# Patient Record
Sex: MALE
[De-identification: names, ages, dates, MRNs, and addresses within clinical notes are randomized per-mention and may not be internally consistent; named-entity substitution may affect disease eponyms.]

---

## 2019-06-18 ENCOUNTER — APPOINTMENT (OUTPATIENT)
Dept: OTOLARYNGOLOGY | Facility: CLINIC | Age: 84
End: 2019-06-18

## 2020-08-03 ENCOUNTER — APPOINTMENT (OUTPATIENT)
Dept: OTOLARYNGOLOGY | Facility: CLINIC | Age: 85
End: 2020-08-03
Payer: MEDICARE

## 2020-08-03 DIAGNOSIS — Z86.69 PERSONAL HISTORY OF OTHER DISEASES OF THE NERVOUS SYSTEM AND SENSE ORGANS: ICD-10-CM

## 2020-08-03 DIAGNOSIS — Z86.39 PERSONAL HISTORY OF OTHER ENDOCRINE, NUTRITIONAL AND METABOLIC DISEASE: ICD-10-CM

## 2020-08-03 DIAGNOSIS — Z86.79 PERSONAL HISTORY OF OTHER DISEASES OF THE CIRCULATORY SYSTEM: ICD-10-CM

## 2020-08-03 DIAGNOSIS — Z85.820 PERSONAL HISTORY OF MALIGNANT MELANOMA OF SKIN: ICD-10-CM

## 2020-08-03 DIAGNOSIS — Z83.3 FAMILY HISTORY OF DIABETES MELLITUS: ICD-10-CM

## 2020-08-03 DIAGNOSIS — Z87.891 PERSONAL HISTORY OF NICOTINE DEPENDENCE: ICD-10-CM

## 2020-08-03 PROCEDURE — 69210 REMOVE IMPACTED EAR WAX UNI: CPT

## 2020-08-03 PROCEDURE — 99213 OFFICE O/P EST LOW 20 MIN: CPT | Mod: 25

## 2020-08-03 RX ORDER — ROSUVASTATIN CALCIUM 5 MG/1
5 TABLET, FILM COATED ORAL
Qty: 30 | Refills: 0 | Status: ACTIVE | COMMUNITY
Start: 2019-11-11

## 2020-08-03 RX ORDER — CLOTRIMAZOLE 10 MG/ML
1 SOLUTION TOPICAL
Qty: 30 | Refills: 0 | Status: ACTIVE | COMMUNITY
Start: 2020-07-29

## 2020-08-03 RX ORDER — SULFAMETHOXAZOLE AND TRIMETHOPRIM 800; 160 MG/1; MG/1
800-160 TABLET ORAL
Qty: 14 | Refills: 0 | Status: ACTIVE | COMMUNITY
Start: 2020-06-19

## 2020-08-03 RX ORDER — METOPROLOL TARTRATE 25 MG/1
25 TABLET, FILM COATED ORAL
Qty: 60 | Refills: 0 | Status: ACTIVE | COMMUNITY
Start: 2019-11-11

## 2020-08-03 RX ORDER — FUROSEMIDE 40 MG/1
40 TABLET ORAL
Qty: 30 | Refills: 0 | Status: ACTIVE | COMMUNITY
Start: 2019-11-11

## 2020-08-03 RX ORDER — TAMSULOSIN HYDROCHLORIDE 0.4 MG/1
0.4 CAPSULE ORAL
Qty: 30 | Refills: 0 | Status: ACTIVE | COMMUNITY
Start: 2019-11-11

## 2020-08-03 RX ORDER — AMLODIPINE BESYLATE 2.5 MG/1
2.5 TABLET ORAL
Qty: 30 | Refills: 0 | Status: ACTIVE | COMMUNITY
Start: 2020-06-28

## 2020-08-03 RX ORDER — QUINAPRIL HYDROCHLORIDE 10 MG/1
10 TABLET, FILM COATED ORAL
Qty: 30 | Refills: 0 | Status: ACTIVE | COMMUNITY
Start: 2020-06-28

## 2020-08-03 RX ORDER — CLOPIDOGREL BISULFATE 75 MG/1
75 TABLET, FILM COATED ORAL
Qty: 30 | Refills: 0 | Status: ACTIVE | COMMUNITY
Start: 2019-11-11

## 2020-08-03 NOTE — ASSESSMENT
[FreeTextEntry1] : cerumen impaction as removed. He was able to hear better afterwards. \par \par PLAN\par \par -findings and management options discussed in detail with the patient and his wife. \par -good aural hygiene\par -annual audiogram- he has his hearing checked by his audiologist. \par -he will see his audiologist to check his hearing aids as needed. \par -follow up in 6 months\par -call and return earlier if any concerns. \par \par

## 2020-08-03 NOTE — HISTORY OF PRESENT ILLNESS
[de-identified] : HUY ROBERTO is a 89 year patient Here for cerumen impaction. He saw his audiologist. He was found to have wax buildup. He has a history of bilateral sensorineural hearing loss. He uses hearing aids.  He has no otalgia otorrhea. He has done well through the COVID outbreak. All precautions and recommendations per [facility] were taken during the visit including the use of PPE.\par

## 2020-08-03 NOTE — CONSULT LETTER
[Dear  ___] : Dear  [unfilled], [Courtesy Letter:] : I had the pleasure of seeing your patient, [unfilled], in my office today. [Sincerely,] : Sincerely, [Consult Closing:] : Thank you very much for allowing me to participate in the care of this patient.  If you have any questions, please do not hesitate to contact me. [Please see my note below.] : Please see my note below. [FreeTextEntry3] : Bianca Caldwell MD\par

## 2021-03-08 ENCOUNTER — APPOINTMENT (OUTPATIENT)
Dept: OTOLARYNGOLOGY | Facility: CLINIC | Age: 86
End: 2021-03-08

## 2021-06-09 ENCOUNTER — APPOINTMENT (OUTPATIENT)
Dept: OTOLARYNGOLOGY | Facility: CLINIC | Age: 86
End: 2021-06-09
Payer: MEDICARE

## 2021-06-09 VITALS — TEMPERATURE: 97 F

## 2021-06-09 PROCEDURE — 69210 REMOVE IMPACTED EAR WAX UNI: CPT

## 2021-06-09 PROCEDURE — 99212 OFFICE O/P EST SF 10 MIN: CPT | Mod: 25

## 2021-06-09 NOTE — HISTORY OF PRESENT ILLNESS
[de-identified] : HUY ROBERTO is a 89 year patient With a history of hearing loss and recurrent cerumen impaction. He is here to have his ears cleaned. He has had more difficulty hearing. He saw his audiologist recently. He has no otalgia or otorrhea.

## 2021-06-09 NOTE — ASSESSMENT
[FreeTextEntry1] : Cerumen impaction was removed. He could hear better afterwards. \par \par PLAN\par \par -findings and management options discussed in detail with the patient and his wife. \par -good aural hygiene\par -he will see his audiologist to check his hearing aids as needed. and his hearing as needed .\par -follow up in 6 months. \par -call and return earlier if any concerns. \par \par

## 2021-06-09 NOTE — CONSULT LETTER
[Dear  ___] : Dear  [unfilled], [Courtesy Letter:] : I had the pleasure of seeing your patient, [unfilled], in my office today. [Please see my note below.] : Please see my note below. [Consult Closing:] : Thank you very much for allowing me to participate in the care of this patient.  If you have any questions, please do not hesitate to contact me. [Sincerely,] : Sincerely, [FreeTextEntry3] : Bianca Caldwell MD\par

## 2021-11-18 ENCOUNTER — APPOINTMENT (OUTPATIENT)
Dept: OTOLARYNGOLOGY | Facility: CLINIC | Age: 86
End: 2021-11-18
Payer: MEDICARE

## 2021-11-18 PROCEDURE — 69210 REMOVE IMPACTED EAR WAX UNI: CPT

## 2021-11-18 PROCEDURE — 99212 OFFICE O/P EST SF 10 MIN: CPT | Mod: 25

## 2021-11-18 NOTE — ASSESSMENT
[FreeTextEntry1] : Cerumen impaction was removed. He was able to hear better afterwards.\par \par PLAN\par \par -findings and management options discussed in detail with the patient and his wife. \par -good aural hygiene\par -he will follow up with his audiologist\par -We discussed workup for the throat symptoms. I asked her to consider further evaluation with flexible laryngoscopy and possible barium swallow depending on how he is doing. She is going to hold for now. \par -follow up in 6 months or earlier if needed.

## 2021-11-18 NOTE — HISTORY OF PRESENT ILLNESS
[de-identified] : HUY ROBERTO is a 89 year patient Here for cerumen impaction. He has a history of bilateral sensorineural hearing loss. He uses hearing aid. He has no otalgia or otorrhea. His wife also says he has occasional throat clearing and coughing when he tries to talk while eating. She denies aspiration or dysphagia. He may have reflux

## 2022-04-18 ENCOUNTER — APPOINTMENT (OUTPATIENT)
Dept: OTOLARYNGOLOGY | Facility: CLINIC | Age: 87
End: 2022-04-18
Payer: MEDICARE

## 2022-04-18 VITALS — WEIGHT: 153 LBS | HEIGHT: 68 IN | TEMPERATURE: 97.8 F | BODY MASS INDEX: 23.19 KG/M2

## 2022-04-18 PROCEDURE — 99212 OFFICE O/P EST SF 10 MIN: CPT | Mod: 25

## 2022-04-18 PROCEDURE — 69210 REMOVE IMPACTED EAR WAX UNI: CPT

## 2022-04-18 RX ORDER — QUETIAPINE 150 MG/1
TABLET, EXTENDED RELEASE ORAL
Refills: 0 | Status: ACTIVE | COMMUNITY

## 2022-04-18 NOTE — ASSESSMENT
[FreeTextEntry1] : He had cerumen impaction bilaterally which was removed.  He felt better afterwards.\par \par PLAN\par \par -findings and management options discussed in detail with the patient and his wife. \par -good aural hygiene\par -He sees his audiologist to have his hearing and hearing aids checked\par -I again discussed workup for dysphagia.  They have decided to hold off on work-up for now\par -follow up in 6 months or earlier if needed to check his ears

## 2022-04-18 NOTE — HISTORY OF PRESENT ILLNESS
[de-identified] : HUY ROBERTO is a 89 year old patient with a history of bilateral sensorineural hearing loss and recurrent cerumen impaction.  He is here to have the ears cleaned.  He has no ear symptoms.  He uses hearing aids.  He continues to have occasional dysphagia with coughing while eating.  His wife denies aspiration.  I had spoken to them about further work-up including flexible laryngoscopy, modified barium swallow, and swallow therapy evaluation.  They had opted for observation

## 2022-10-17 ENCOUNTER — APPOINTMENT (OUTPATIENT)
Dept: OTOLARYNGOLOGY | Facility: CLINIC | Age: 87
End: 2022-10-17

## 2022-10-17 VITALS — WEIGHT: 153 LBS | TEMPERATURE: 97 F | HEIGHT: 68 IN | BODY MASS INDEX: 23.19 KG/M2

## 2022-10-17 PROCEDURE — 69210 REMOVE IMPACTED EAR WAX UNI: CPT

## 2022-10-17 PROCEDURE — 99212 OFFICE O/P EST SF 10 MIN: CPT | Mod: 25

## 2022-10-17 NOTE — ASSESSMENT
[FreeTextEntry1] : Cerumen impaction was removed from the ears.  He did feel he could hear better afterwards.\par \par PLAN\par \par -findings and management options discussed in detail with the patient and his wife. \par -continue good aural hygiene\par -follow up in 6 months or earlier if needed to check his ears

## 2022-10-17 NOTE — HISTORY OF PRESENT ILLNESS
[de-identified] : HUY ROBERTO is a 89 year old patient here for recurrent cerumen impaction.  He has a history of bilateral sensorineural hearing loss and uses hearing aids.  He has no otalgia or otorrhea.

## 2023-04-03 ENCOUNTER — APPOINTMENT (OUTPATIENT)
Dept: OTOLARYNGOLOGY | Facility: CLINIC | Age: 88
End: 2023-04-03

## 2024-06-10 ENCOUNTER — APPOINTMENT (OUTPATIENT)
Dept: OTOLARYNGOLOGY | Facility: CLINIC | Age: 89
End: 2024-06-10
Payer: MEDICARE

## 2024-06-10 DIAGNOSIS — H61.23 IMPACTED CERUMEN, BILATERAL: ICD-10-CM

## 2024-06-10 DIAGNOSIS — H90.3 SENSORINEURAL HEARING LOSS, BILATERAL: ICD-10-CM

## 2024-06-10 PROCEDURE — 69210 REMOVE IMPACTED EAR WAX UNI: CPT

## 2024-06-10 PROCEDURE — 99213 OFFICE O/P EST LOW 20 MIN: CPT | Mod: 25

## 2024-06-10 NOTE — PHYSICAL EXAM
[TextEntry] : General: The patient was alert, oriented and in no distress. Voice was clear.  Face: The patient had no facial asymmetry or mass. The skin was unremarkable.  Ears: He is hard of hearing External ears were normal without deformity. PROCEDURE- EAR MICROSCOPY AND CERUMEN REMOVAL  Indication: cerumen removal Under the microscope, obstructing cerumen was removed from the ear with a forceps.  Canal(s): normal. no inflammation.  Tympanic membrane(s): Intact. no perforation or effusion.  Nose: The external nose had no significant deformity.    On anterior rhinoscopy, the nasal mucosa was normal.  The anterior septum was grossly midline. There were no visualized polyps, purulence  or masses.  Oral cavity: Oral mucosa- normal. Oral and base of tongue- clear and without mass. Gingival and buccal mucosa- moist and without lesions. Palate- the palate moved well. There was no cleft palate. There appeared to be good salivary flow.  Oral cavity/oropharynx- no pus, erythema or mass  Neck: The neck was symmetrical. The parotid and submandibular glands were normal without masses. The trachea was midline and there was no unusual crepitus. Thyroid was smooth and nontender and no masses were palpated. No masses  Lymphatics: Cervical adenopathy- none.

## 2024-06-10 NOTE — HISTORY OF PRESENT ILLNESS
[de-identified] : HUY ROBERTO is a 89 year old patient With a history of bilateral sensorineural hearing loss here for cerumen impaction.  He uses hearing aids in both ears.  According to his wife, they sometimes have difficulty getting the hearing aids to stay in the ears.  He has no otalgia or otorrhea

## 2024-06-10 NOTE — ASSESSMENT
[FreeTextEntry1] : He had cerumen impaction bilaterally which was removed.  Plan -Findings and management options were discussed with the patient and his wife - Continue good ear hygiene - He will follow-up with his audiologist to have the hearing aids checked and his hearing tested - Follow-up in 6 months or earlier if needed